# Patient Record
Sex: FEMALE | Race: WHITE
[De-identification: names, ages, dates, MRNs, and addresses within clinical notes are randomized per-mention and may not be internally consistent; named-entity substitution may affect disease eponyms.]

---

## 2022-03-07 ENCOUNTER — HOSPITAL ENCOUNTER (OUTPATIENT)
Dept: HOSPITAL 100 - US | Age: 47
Discharge: HOME | End: 2022-03-07
Payer: COMMERCIAL

## 2022-03-07 DIAGNOSIS — N39.3: Primary | ICD-10-CM

## 2022-03-07 PROCEDURE — 76856 US EXAM PELVIC COMPLETE: CPT

## 2023-03-06 ENCOUNTER — TELEPHONE (OUTPATIENT)
Dept: PRIMARY CARE | Facility: CLINIC | Age: 48
End: 2023-03-06
Payer: COMMERCIAL

## 2023-03-06 DIAGNOSIS — R91.1 LUNG NODULE: ICD-10-CM

## 2023-07-10 ASSESSMENT — ENCOUNTER SYMPTOMS
PND: 0
HYPERTENSION: 1
HEADACHES: 0
BLURRED VISION: 0
SHORTNESS OF BREATH: 0
ORTHOPNEA: 0
NECK PAIN: 0
SWEATS: 0
PALPITATIONS: 0

## 2023-07-11 PROBLEM — I10 HTN (HYPERTENSION): Status: ACTIVE | Noted: 2023-07-11

## 2023-07-11 PROBLEM — E78.5 HYPERLIPIDEMIA: Status: ACTIVE | Noted: 2023-07-11

## 2023-07-11 PROBLEM — G43.909 MIGRAINE: Status: ACTIVE | Noted: 2023-07-11

## 2023-07-11 PROBLEM — E55.9 VITAMIN D DEFICIENCY: Status: ACTIVE | Noted: 2023-07-11

## 2023-07-11 RX ORDER — ACETAMINOPHEN 500 MG
TABLET ORAL DAILY
COMMUNITY
Start: 2022-06-06

## 2023-07-11 RX ORDER — LISINOPRIL 10 MG/1
10 TABLET ORAL DAILY
COMMUNITY
End: 2023-07-12 | Stop reason: SDUPTHER

## 2023-07-11 RX ORDER — SUMATRIPTAN 50 MG/1
50 TABLET, FILM COATED ORAL
COMMUNITY
Start: 2019-05-15

## 2023-07-12 ENCOUNTER — OFFICE VISIT (OUTPATIENT)
Dept: PRIMARY CARE | Facility: CLINIC | Age: 48
End: 2023-07-12
Payer: COMMERCIAL

## 2023-07-12 VITALS
RESPIRATION RATE: 16 BRPM | BODY MASS INDEX: 21 KG/M2 | HEIGHT: 64 IN | SYSTOLIC BLOOD PRESSURE: 100 MMHG | HEART RATE: 74 BPM | DIASTOLIC BLOOD PRESSURE: 60 MMHG | WEIGHT: 123 LBS | TEMPERATURE: 97.2 F

## 2023-07-12 DIAGNOSIS — Z00.00 ROUTINE GENERAL MEDICAL EXAMINATION AT A HEALTH CARE FACILITY: ICD-10-CM

## 2023-07-12 DIAGNOSIS — I10 PRIMARY HYPERTENSION: Primary | ICD-10-CM

## 2023-07-12 DIAGNOSIS — N92.0 MENORRHAGIA WITH REGULAR CYCLE: ICD-10-CM

## 2023-07-12 PROBLEM — M62.08 DIASTASIS RECTI: Status: ACTIVE | Noted: 2023-07-12

## 2023-07-12 PROCEDURE — 99214 OFFICE O/P EST MOD 30 MIN: CPT | Performed by: INTERNAL MEDICINE

## 2023-07-12 PROCEDURE — 3078F DIAST BP <80 MM HG: CPT | Performed by: INTERNAL MEDICINE

## 2023-07-12 PROCEDURE — 1036F TOBACCO NON-USER: CPT | Performed by: INTERNAL MEDICINE

## 2023-07-12 PROCEDURE — 3074F SYST BP LT 130 MM HG: CPT | Performed by: INTERNAL MEDICINE

## 2023-07-12 RX ORDER — LISINOPRIL 10 MG/1
10 TABLET ORAL DAILY
Qty: 90 TABLET | Refills: 1 | Status: SHIPPED | OUTPATIENT
Start: 2023-07-12 | End: 2024-01-05 | Stop reason: SDUPTHER

## 2023-07-12 RX ORDER — LISINOPRIL 10 MG/1
10 TABLET ORAL DAILY
Qty: 90 TABLET | Refills: 1 | Status: SHIPPED | OUTPATIENT
Start: 2023-07-12 | End: 2023-07-12 | Stop reason: SDUPTHER

## 2023-07-12 ASSESSMENT — PATIENT HEALTH QUESTIONNAIRE - PHQ9
1. LITTLE INTEREST OR PLEASURE IN DOING THINGS: NOT AT ALL
2. FEELING DOWN, DEPRESSED OR HOPELESS: NOT AT ALL
SUM OF ALL RESPONSES TO PHQ9 QUESTIONS 1 AND 2: 0

## 2023-07-12 ASSESSMENT — COLUMBIA-SUICIDE SEVERITY RATING SCALE - C-SSRS
1. IN THE PAST MONTH, HAVE YOU WISHED YOU WERE DEAD OR WISHED YOU COULD GO TO SLEEP AND NOT WAKE UP?: NO
6. HAVE YOU EVER DONE ANYTHING, STARTED TO DO ANYTHING, OR PREPARED TO DO ANYTHING TO END YOUR LIFE?: NO
2. HAVE YOU ACTUALLY HAD ANY THOUGHTS OF KILLING YOURSELF?: NO

## 2023-07-12 ASSESSMENT — ENCOUNTER SYMPTOMS: DEPRESSION: 0

## 2023-07-12 NOTE — PROGRESS NOTES
"Subjective   Patient ID: Pilar Marquez is a 48 y.o. female who presents for Hypertension.  HPI  Patient presents today for follow-up of hypertension.  She is feeling well.  She has no complaints.  She uses lisinopril 10 mg daily and has been compliant she has no headaches chest pains lightheadedness dizziness palpitations or lower extremity edema her exercise tolerance is good she is eating healthy and exercising regularly.  She takes vitamin D supplement and uses an occasional sumatriptan for migraines.  She is up-to-date on her well woman exams but she is having more frequent periods they are occurring about once every 2 weeks last Pap was last was with us in January.  She states its been going on for about the last year and becoming more frequent low normal induration they are heavy for the first couple of days and they are occurring almost regularly every 2 weeks    Review of Systems  Review of systems was performed and is otherwise negative except as noted in HPI.  Objective   /60   Pulse 74   Temp 36.2 °C (97.2 °F)   Resp 16   Ht 1.613 m (5' 3.5\")   Wt 55.8 kg (123 lb)   BMI 21.45 kg/m²    Physical Exam  HEENT is normal  Lungs clear bilaterally  Heart regular rate and rhythm no murmurs  Abdomen is benign  Lower extremities no edema  Assessment/Plan   Diagnoses and all orders for this visit:  Primary hypertension  Routine general medical examination at a health care facility  -     CBC and Auto Differential; Future  -     Comprehensive Metabolic Panel; Future  -     Lipid Panel; Future  -     TSH with reflex to Free T4 if abnormal; Future  -     Follow Up In [facility] - PCP - Health Maintenance; Future  -     lisinopril 10 mg tablet; Take 1 tablet (10 mg) by mouth once daily.  Menorrhagia with regular cycle  -     CBC and Auto Differential; Future  -     Iron and TIBC; Future  -     Ferritin; Future  -     TSH with reflex to Free T4 if abnormal; Future    Call with " issues  Check blood work now for CBC iron and thyroid  Blood work ordered written for physical for January  Refills are sent    Rosa Youngblood MD

## 2023-12-12 DIAGNOSIS — Z12.31 VISIT FOR SCREENING MAMMOGRAM: ICD-10-CM

## 2024-01-05 ENCOUNTER — OFFICE VISIT (OUTPATIENT)
Dept: PRIMARY CARE | Facility: CLINIC | Age: 49
End: 2024-01-05
Payer: COMMERCIAL

## 2024-01-05 VITALS
WEIGHT: 129 LBS | TEMPERATURE: 97.9 F | BODY MASS INDEX: 22.02 KG/M2 | HEART RATE: 79 BPM | SYSTOLIC BLOOD PRESSURE: 108 MMHG | HEIGHT: 64 IN | DIASTOLIC BLOOD PRESSURE: 66 MMHG | OXYGEN SATURATION: 98 %

## 2024-01-05 DIAGNOSIS — E78.2 MIXED HYPERLIPIDEMIA: ICD-10-CM

## 2024-01-05 DIAGNOSIS — Z00.00 ROUTINE GENERAL MEDICAL EXAMINATION AT A HEALTH CARE FACILITY: Primary | ICD-10-CM

## 2024-01-05 DIAGNOSIS — Z12.4 ENCOUNTER FOR PAPANICOLAOU SMEAR FOR CERVICAL CANCER SCREENING: ICD-10-CM

## 2024-01-05 DIAGNOSIS — Z12.11 COLON CANCER SCREENING: ICD-10-CM

## 2024-01-05 DIAGNOSIS — I10 PRIMARY HYPERTENSION: ICD-10-CM

## 2024-01-05 PROCEDURE — 99396 PREV VISIT EST AGE 40-64: CPT | Performed by: INTERNAL MEDICINE

## 2024-01-05 PROCEDURE — 3074F SYST BP LT 130 MM HG: CPT | Performed by: INTERNAL MEDICINE

## 2024-01-05 PROCEDURE — 3078F DIAST BP <80 MM HG: CPT | Performed by: INTERNAL MEDICINE

## 2024-01-05 PROCEDURE — 88175 CYTOPATH C/V AUTO FLUID REDO: CPT

## 2024-01-05 PROCEDURE — 1036F TOBACCO NON-USER: CPT | Performed by: INTERNAL MEDICINE

## 2024-01-05 PROCEDURE — 87624 HPV HI-RISK TYP POOLED RSLT: CPT

## 2024-01-05 RX ORDER — BISMUTH SUBSALICYLATE 262 MG
1 TABLET,CHEWABLE ORAL DAILY
COMMUNITY

## 2024-01-05 RX ORDER — LISINOPRIL 10 MG/1
10 TABLET ORAL DAILY
Qty: 90 TABLET | Refills: 1 | Status: SHIPPED | OUTPATIENT
Start: 2024-01-05

## 2024-01-17 LAB
CYTOLOGY CMNT CVX/VAG CYTO-IMP: NORMAL
HPV HR 12 DNA GENITAL QL NAA+PROBE: NEGATIVE
HPV HR GENOTYPES PNL CVX NAA+PROBE: NEGATIVE
HPV16 DNA SPEC QL NAA+PROBE: NEGATIVE
HPV18 DNA SPEC QL NAA+PROBE: NEGATIVE
LAB AP HPV GENOTYPE QUESTION: YES
LAB AP HPV HR: NORMAL
LABORATORY COMMENT REPORT: NORMAL
PATH REPORT.TOTAL CANCER: NORMAL

## 2024-07-16 ENCOUNTER — APPOINTMENT (OUTPATIENT)
Dept: PRIMARY CARE | Facility: CLINIC | Age: 49
End: 2024-07-16
Payer: COMMERCIAL

## 2024-07-16 VITALS
BODY MASS INDEX: 21.17 KG/M2 | TEMPERATURE: 98.3 F | DIASTOLIC BLOOD PRESSURE: 68 MMHG | OXYGEN SATURATION: 95 % | HEIGHT: 64 IN | WEIGHT: 124 LBS | HEART RATE: 85 BPM | SYSTOLIC BLOOD PRESSURE: 104 MMHG

## 2024-07-16 DIAGNOSIS — G43.009 MIGRAINE WITHOUT AURA AND WITHOUT STATUS MIGRAINOSUS, NOT INTRACTABLE: Primary | ICD-10-CM

## 2024-07-16 DIAGNOSIS — Z00.00 ROUTINE GENERAL MEDICAL EXAMINATION AT A HEALTH CARE FACILITY: ICD-10-CM

## 2024-07-16 DIAGNOSIS — I10 PRIMARY HYPERTENSION: ICD-10-CM

## 2024-07-16 PROCEDURE — 1036F TOBACCO NON-USER: CPT | Performed by: INTERNAL MEDICINE

## 2024-07-16 PROCEDURE — 3074F SYST BP LT 130 MM HG: CPT | Performed by: INTERNAL MEDICINE

## 2024-07-16 PROCEDURE — 99214 OFFICE O/P EST MOD 30 MIN: CPT | Performed by: INTERNAL MEDICINE

## 2024-07-16 PROCEDURE — 3078F DIAST BP <80 MM HG: CPT | Performed by: INTERNAL MEDICINE

## 2024-07-16 RX ORDER — LISINOPRIL 10 MG/1
10 TABLET ORAL DAILY
Qty: 90 TABLET | Refills: 1 | Status: SHIPPED | OUTPATIENT
Start: 2024-07-16 | End: 2024-07-16

## 2024-07-16 RX ORDER — LISINOPRIL 10 MG/1
10 TABLET ORAL DAILY
Qty: 90 TABLET | Refills: 1 | Status: SHIPPED | OUTPATIENT
Start: 2024-07-16

## 2024-07-16 RX ORDER — SUMATRIPTAN 50 MG/1
50 TABLET, FILM COATED ORAL ONCE AS NEEDED
Qty: 9 TABLET | Refills: 1 | Status: SHIPPED | OUTPATIENT
Start: 2024-07-16

## 2024-07-16 ASSESSMENT — PATIENT HEALTH QUESTIONNAIRE - PHQ9
2. FEELING DOWN, DEPRESSED OR HOPELESS: NOT AT ALL
SUM OF ALL RESPONSES TO PHQ9 QUESTIONS 1 AND 2: 0
1. LITTLE INTEREST OR PLEASURE IN DOING THINGS: NOT AT ALL

## 2024-07-16 ASSESSMENT — ENCOUNTER SYMPTOMS
DEPRESSION: 0
LOSS OF SENSATION IN FEET: 0
OCCASIONAL FEELINGS OF UNSTEADINESS: 0

## 2024-07-16 NOTE — PROGRESS NOTES
"Subjective   Patient ID: Pilar Marquez is a 49 y.o. female who presents for Follow-up (EP.  Follow up.  Labs done.  No concerns.).  HPI  Here for mid yr follow up for hypertension.  She has been compliant with her medications.  She needs a small refill on her migraine medication that she rarely uses.  She denies chest pains headaches dizziness lightheadedness or shortness of breath.  She has no lower extremity edema.  She is otherwise been feeling well.  She has no other complaints.  She had blood work done which we reviewed in the office today including CMP and lipid panel it was done in an outside lab and will be scanned into the chart    Review of Systems  Review of systems was performed and is otherwise negative except as noted in HPI.  Objective   /68   Pulse 85   Temp 36.8 °C (98.3 °F) (Oral)   Ht 1.613 m (5' 3.5\")   Wt 56.2 kg (124 lb)   SpO2 95%   BMI 21.62 kg/m²    Physical Exam  HEENT is normal  Lungs clear bilaterally  Heart is regular rate rhythm no murmurs  Abdomen benign  Lower extremities no edema    Assessment/Plan   Diagnoses and all orders for this visit:  Migraine without aura and without status migrainosus, not intractable  -     SUMAtriptan (Imitrex) 50 mg tablet; Take 1 tablet (50 mg) by mouth 1 time if needed for migraine for up to 18 doses.  Routine general medical examination at a health care facility  -     lisinopril 10 mg tablet; Take 1 tablet (10 mg) by mouth once daily.    Call with issues  Blood work ordered  Follow-up with me in 6 months  Blood work is reviewed  Rosa Youngblood MD   "

## 2025-01-17 ENCOUNTER — APPOINTMENT (OUTPATIENT)
Dept: PRIMARY CARE | Facility: CLINIC | Age: 50
End: 2025-01-17
Payer: COMMERCIAL

## 2025-01-17 VITALS
HEART RATE: 75 BPM | DIASTOLIC BLOOD PRESSURE: 72 MMHG | WEIGHT: 126 LBS | BODY MASS INDEX: 21.51 KG/M2 | OXYGEN SATURATION: 98 % | TEMPERATURE: 98 F | SYSTOLIC BLOOD PRESSURE: 112 MMHG | HEIGHT: 64 IN

## 2025-01-17 DIAGNOSIS — Z00.00 ROUTINE GENERAL MEDICAL EXAMINATION AT A HEALTH CARE FACILITY: ICD-10-CM

## 2025-01-17 DIAGNOSIS — G43.009 MIGRAINE WITHOUT AURA AND WITHOUT STATUS MIGRAINOSUS, NOT INTRACTABLE: ICD-10-CM

## 2025-01-17 DIAGNOSIS — Z12.31 ENCOUNTER FOR SCREENING MAMMOGRAM FOR MALIGNANT NEOPLASM OF BREAST: ICD-10-CM

## 2025-01-17 DIAGNOSIS — Z12.4 ENCOUNTER FOR PAPANICOLAOU SMEAR FOR CERVICAL CANCER SCREENING: ICD-10-CM

## 2025-01-17 DIAGNOSIS — I10 PRIMARY HYPERTENSION: ICD-10-CM

## 2025-01-17 DIAGNOSIS — E78.2 MIXED HYPERLIPIDEMIA: Primary | ICD-10-CM

## 2025-01-17 PROCEDURE — 88175 CYTOPATH C/V AUTO FLUID REDO: CPT

## 2025-01-17 PROCEDURE — 3008F BODY MASS INDEX DOCD: CPT | Performed by: INTERNAL MEDICINE

## 2025-01-17 PROCEDURE — 99396 PREV VISIT EST AGE 40-64: CPT | Performed by: INTERNAL MEDICINE

## 2025-01-17 PROCEDURE — 87624 HPV HI-RISK TYP POOLED RSLT: CPT

## 2025-01-17 PROCEDURE — 3078F DIAST BP <80 MM HG: CPT | Performed by: INTERNAL MEDICINE

## 2025-01-17 PROCEDURE — 3074F SYST BP LT 130 MM HG: CPT | Performed by: INTERNAL MEDICINE

## 2025-01-17 PROCEDURE — 1036F TOBACCO NON-USER: CPT | Performed by: INTERNAL MEDICINE

## 2025-01-17 RX ORDER — LISINOPRIL 10 MG/1
10 TABLET ORAL DAILY
Qty: 90 TABLET | Refills: 1 | Status: SHIPPED | OUTPATIENT
Start: 2025-01-17

## 2025-01-17 RX ORDER — SUMATRIPTAN SUCCINATE 50 MG/1
50 TABLET ORAL ONCE AS NEEDED
Qty: 9 TABLET | Refills: 3 | Status: SHIPPED | OUTPATIENT
Start: 2025-01-17

## 2025-01-17 ASSESSMENT — PATIENT HEALTH QUESTIONNAIRE - PHQ9
1. LITTLE INTEREST OR PLEASURE IN DOING THINGS: NOT AT ALL
SUM OF ALL RESPONSES TO PHQ9 QUESTIONS 1 AND 2: 0
2. FEELING DOWN, DEPRESSED OR HOPELESS: NOT AT ALL

## 2025-01-17 ASSESSMENT — ENCOUNTER SYMPTOMS
OCCASIONAL FEELINGS OF UNSTEADINESS: 0
LOSS OF SENSATION IN FEET: 0
DEPRESSION: 0

## 2025-01-17 NOTE — PROGRESS NOTES
Subjective   Patient ID: Pilar Conley is a 49 y.o. female who presents for Gynecologic Exam (EP.  DIYAE.  Labs done at [facility].  Eye problems going on for a few months.).    HPI  Patient is here for annual check-up    Last well check 1 yr     Reported health good    Dental  check    reg     Vision check  due  2 yrs    Vision issues n  Hearing issues n  Vaccines UTD  y    Diet healthier now   Exercise  working on it   Caffeine 2 day  Alcohol rare  Tobacco prev social quit in 1997    Colon cancer screening  never     Current issues:  left eye red scales  peels itches    Not swollen   She has tried multiple moisturizers hypoallergenic treatments avoiding treatments for her skin near her eyes as well as hydrocortisone half and 1%  Review of Systems  GENERAL - Denies fever, fatigue or chills  SKIN -see above  EYES - Denies blurred vision, or change in visual acuity  EARS - Denies ear pain, discharge, ringing, or difficulty hearing  NOSE - Denies nasal congestion, discharge, or bleeding  MOUTH - Denies sore throat, postnasal drip or painful/difficulty swallowing  NECK - Denies pain or swelling  RESPIRATORY - Denies shortness of breath, cough, wheezing  CARDIOVASCULAR - Denies palpitations, chest pain, orthopnea, peripheral edema, syncope or claudication  GASTROINTESTINAL - Denies nausea, vomiting, diarrhea, constipation, abdominal pain, melena and or bright red blood  GENITOURINARY - Denies dysuria, frequency of urination, urgency, or hesitancy  MUSCULOSKELETAL - Denies joint or muscle pain, or back pain  NEUROLOGICAL - Denies localized numbness, weakness, or tingling  PSYCHIATRIC - Denies depression, anxiety, substance abuse, suicidal or homicidal ideation  ENDOCRINE - Denies heat or cold intolerance, weight loss or gain, increasing thirst  HEMATO-IMMUNOLOGIC - Denies easy bruising, bleeding, oral ulcerations or recurrent infections    Objective   /72   Pulse 75   Temp 36.7 °C (98 °F) (Oral)   Ht 1.613 m  "(5' 3.5\")   Wt 57.2 kg (126 lb)   LMP 01/10/2025   SpO2 98%   BMI 21.97 kg/m²      Physical Exam  CONSTITUTIONAL - well nourished, well developed, looks like stated age, in no acute distress, not ill-appearing, and not tired appearing  SKIN -she has redness swelling and peeling that is mild on her left eye slight redness on her right eyes skin   HEAD - no trauma, normocephalic  EYES - pupils are equal and reactive to light, extraocular muscles are intact, and normal external exam  ENT - TM's intact, no injection, no signs of infection, uvula midline, normal tongue movement and throat normal, no exudate, nasal passage without discharge and patent  NECK - supple without rigidity, no neck mass was observed, no thyromegaly or thyroid nodules  CHEST - clear to auscultation, no wheezing, no crackles and no rales, good effort  Breast normal bilaterally no masses no retractions no discharge axilla normal  CARDIAC - regular rate and regular rhythm, no skipped beats, no murmur  ABDOMEN - no organomegaly, soft, nontender, nondistended, normal bowel sounds, no guarding/rebound/rigidity, negative McBurney sign and negative Austin sign   external/internal genitalia normal bimanual normal cervix normal  EXTREMITIES - no edema, no deformities  NEUROLOGICAL - normal gait, normal balance, normal motor, no ataxia, DTRs equal and symmetrical; alert, oriented and no focal signs  PSYCHIATRIC - alert, pleasant and cordial, age-appropriate  IMMUNOLOGIC - no cervical lymphadenopathy    Assessment/Plan   Diagnoses and all orders for this visit:  Mixed hyperlipidemia  -     Lipid Panel; Future  -     Comprehensive Metabolic Panel; Future  Routine general medical examination at a health care facility  Encounter for screening mammogram for malignant neoplasm of breast  -     BI mammo bilateral screening tomosynthesis; Future  Encounter for Papanicolaou smear for cervical cancer screening  -     THINPREP PAP TEST  Primary hypertension  -   "   Lipid Panel; Future  -     Comprehensive Metabolic Panel; Future  -     lisinopril 10 mg tablet; Take 1 tablet (10 mg) by mouth once daily.  Migraine without aura and without status migrainosus, not intractable  -     SUMAtriptan (Imitrex) 50 mg tablet; Take 1 tablet (50 mg) by mouth 1 time if needed for migraine for up to 36 doses.    Needs screening colonoscopy and will call to schedule   Has eye dermatitis  will trial zoryve  Will see derm if fails to resolve   Rosa Youngblood MD

## 2025-01-20 ENCOUNTER — APPOINTMENT (OUTPATIENT)
Dept: RADIOLOGY | Facility: CLINIC | Age: 50
End: 2025-01-20
Payer: COMMERCIAL

## 2025-01-28 ENCOUNTER — HOSPITAL ENCOUNTER (OUTPATIENT)
Dept: RADIOLOGY | Facility: CLINIC | Age: 50
Discharge: HOME | End: 2025-01-28
Payer: COMMERCIAL

## 2025-01-28 VITALS — HEIGHT: 64 IN | WEIGHT: 126.1 LBS | BODY MASS INDEX: 21.53 KG/M2

## 2025-01-28 DIAGNOSIS — Z12.31 ENCOUNTER FOR SCREENING MAMMOGRAM FOR MALIGNANT NEOPLASM OF BREAST: ICD-10-CM

## 2025-01-28 PROCEDURE — 77067 SCR MAMMO BI INCL CAD: CPT | Performed by: RADIOLOGY

## 2025-01-28 PROCEDURE — 77067 SCR MAMMO BI INCL CAD: CPT

## 2025-01-28 PROCEDURE — 77063 BREAST TOMOSYNTHESIS BI: CPT | Performed by: RADIOLOGY

## 2025-01-29 ENCOUNTER — APPOINTMENT (OUTPATIENT)
Dept: RADIOLOGY | Facility: CLINIC | Age: 50
End: 2025-01-29
Payer: COMMERCIAL

## 2025-07-21 ENCOUNTER — APPOINTMENT (OUTPATIENT)
Dept: PRIMARY CARE | Facility: CLINIC | Age: 50
End: 2025-07-21
Payer: COMMERCIAL